# Patient Record
(demographics unavailable — no encounter records)

---

## 2024-10-07 NOTE — PHYSICAL EXAM
[Left] : left shoulder [Sitting] : sitting [Mild] : mild [5 ___] : forward flexion 5[unfilled]/5 [5___] : external rotation 5[unfilled]/5 [Right] : right shoulder [] : no sensory deficits [FreeTextEntry9] : IR to T12. [de-identified] : +arc [FreeTextEntry3] : There is good motion without pain. [TWNoteComboBox4] : passive forward flexion 150 degrees [de-identified] : external rotation 65 degrees

## 2024-10-07 NOTE — IMAGING
[Left] : left shoulder [FreeTextEntry5] : 2V: There is a Type II acromion with a spur. [FreeTextEntry1] : 2V: The GH is OK.  There are minor AC changes.

## 2024-10-07 NOTE — ASSESSMENT
[FreeTextEntry1] : We reviewed the findings and the history. Questions were answered and concerns addressed. The options were outlined. Medication use discussed.  MDP is prescribed.  PT is planned.   Patient seen by Dr. Flako Buitrago, who determined the assessment and plan. Amparo GARSIA participated in the care of the patient, including the history and physical exam. Madhuri BENSON, am scribing for Dr. Flako Buitrago in his presence for the chief complaint, physical exam, studies, assessment, and/or plan.

## 2024-10-07 NOTE — HISTORY OF PRESENT ILLNESS
[de-identified] : 58 yo M presenting with left shoulder pain that started 1 month ago w/o injury. Started after sleeping on shoulder. Slight pain in right. No prior tx.

## 2024-10-07 NOTE — REASON FOR VISIT
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  No prior history.  The pain wakes him.  No numbness.  Reaching is uncomfortable.  Ibuprofen doesn't help.

## 2024-10-07 NOTE — CONSULT LETTER
[Dear  ___] : Dear  [unfilled], [Consult Letter:] : I had the pleasure of evaluating your patient, [unfilled]. [Please see my note below.] : Please see my note below. [Consult Closing:] : Thank you very much for allowing me to participate in the care of this patient.  If you have any questions, please do not hesitate to contact me. [Sincerely,] : Sincerely, [FreeTextEntry3] : Flako Buitrago M.D. Shoulder Surgery